# Patient Record
Sex: MALE | Race: OTHER
[De-identification: names, ages, dates, MRNs, and addresses within clinical notes are randomized per-mention and may not be internally consistent; named-entity substitution may affect disease eponyms.]

---

## 2019-08-08 ENCOUNTER — HOSPITAL ENCOUNTER (EMERGENCY)
Dept: HOSPITAL 25 - UCCORT | Age: 29
Discharge: HOME | End: 2019-08-08
Payer: COMMERCIAL

## 2019-08-08 VITALS — DIASTOLIC BLOOD PRESSURE: 52 MMHG | SYSTOLIC BLOOD PRESSURE: 105 MMHG

## 2019-08-08 DIAGNOSIS — S69.92XA: ICD-10-CM

## 2019-08-08 DIAGNOSIS — S59.912A: ICD-10-CM
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
## 2019-08-08 DIAGNOSIS — S60.222A: Primary | ICD-10-CM

## 2019-08-08 PROCEDURE — G0463 HOSPITAL OUTPT CLINIC VISIT: HCPCS

## 2019-08-08 PROCEDURE — 36415 COLL VENOUS BLD VENIPUNCTURE: CPT

## 2019-08-08 PROCEDURE — 99212 OFFICE O/P EST SF 10 MIN: CPT

## 2019-08-08 PROCEDURE — 87389 HIV-1 AG W/HIV-1&-2 AB AG IA: CPT

## 2019-08-08 NOTE — UC
Upper Extremity HPI





- HPI Summary


HPI Summary: 





Pt presents with c/o left hand, forearm and elbow pain.  He reports that he 

fell from standing on 8/3/19 and has continued c/o left hand pain and swelling 

an left forearm pain.  Pt has been applying ice and taking ibuprofen with 

little to no improvement of pain.  





- History of Current Complaint


Chief Complaint: UCUpperExtremity


Stated Complaint: LEFT HAND/ELBOW PAIN


Time Seen by Provider: 08/08/19 10:46


Hx Obtained From: Patient


Pregnant?: No


Onset/Duration: Sudden Onset, Lasting Days, Still Present


Severity Initially: Moderate


Severity Currently: Moderate


Pain Intensity: 5


Pain Scale Used: 0-10 Numeric


Location Of Pain: Is Discrete @ - left hand, forearm and elbow


Character: Dull, Aching, Stiffness


Aggravating Factor(s): Movement, Lifting, Flexion, Extension, Internal/External 

Rotation, Abduction, Adduction


Alleviating Factor(s): Rest


Associated Signs And Symptoms: Positive: Swelling


Related History: Dominant Hand Right





- Risk Factors


Non-Orthopedic Risk Factor: Negative


DVT Risk Factors: Negative


Septic Arthritis Risk Factor: Negative


Compartment Syndrome Risk Factors: Pain





- Allergies/Home Medications


Allergies/Adverse Reactions: 


 Allergies











Allergy/AdvReac Type Severity Reaction Status Date / Time


 


cephalexin Allergy  Oral Verified 08/08/19 10:13





   Blisters  


 


antibiotic/unknown AdvReac Intermediate Diarrhea Uncoded 08/08/19 10:13














PMH/Surg Hx/FS Hx/Imm Hx


Previously Healthy: Yes





- Surgical History


Surgical History: None





- Family History


Known Family History: Positive: Hypertension, Diabetes


   Negative: Respiratory Disease





- Social History


Occupation: Employed Full-time


Lives: With Family


Alcohol Use: None


Substance Use Type: None


Smoking Status (MU): Never Smoked Tobacco


Have You Smoked in the Last Year: No





- Immunization History


Vaccination Up to Date: Yes





Review of Systems


All Other Systems Reviewed And Are Negative: Yes


Constitutional: Positive: Negative


Skin: Positive: Negative


Eyes: Positive: Negative


ENT: Positive: Negative


Respiratory: Positive: Negative


Cardiovascular: Positive: Negative


Gastrointestinal: Positive: Negative


Genitourinary: Positive: Negative


Motor: Positive: Decreased ROM - left hand and forearm, Weakness


Neurovascular: Positive: Negative


Musculoskeletal: Positive: Arthralgia - left hand and forearm, Decreased ROM - 

left hand and forearm, Edema - left hand, Myalgia


Neurological: Positive: Negative


Psychological: Positive: Negative


Is Patient Immunocompromised?: No





Physical Exam


Triage Information Reviewed: Yes


Appearance: Pain Distress


Vital Signs: 


 Initial Vital Signs











Temp  97.4 F   08/08/19 10:14


 


Pulse  55   08/08/19 10:14


 


Resp  15   08/08/19 10:14


 


BP  105/52   08/08/19 10:14


 


Pulse Ox  100   08/08/19 10:14











Vital Signs Reviewed: Yes


Eye Exam: Normal


ENT: Positive: Hearing grossly normal


Dental Exam: Normal


Neck exam: Normal


Respiratory Exam: Normal


Musculoskeletal: Positive: Strength Limited @ - left forearma nd hand, ROM 

Limited @ - left elbow and forearm, Edema @ - left hand, non pitting


Neurological Exam: Normal


Psychological Exam: Normal


Skin Exam: Normal





Diagnostics





- Radiology


  ** No standard instances


Radiology Interpretation Completed By: Radiologist - 4 views of the left hand 

demonstrates positive ulnar variance. Otherwise there is no fracture or 

dislocation. No other bone or joint abnormality is identified.  IMPRESSION: No 

fracture of the left hand is noted.  4 views of left elbow demonstrates no 

fracture. No other bone or joint abnormality is identified. No joint effusion 

is noted  IMPRESSION: No fracture of the left elbow is noted.





Upper Extremity Course/Dx





- Differential Dx/Diagnosis


Differential Diagnosis/HQI/PQRI: Contusion, Strain, Sprain


Provider Diagnosis: 


 Contusion of left hand, Injury of left forearm and wrist








Discharge





- Sign-Out/Discharge


Documenting (check all that apply): Patient Departure


All imaging exams completed and their final reports reviewed: Yes





- Discharge Plan


Condition: Stable


Disposition: HOME


Patient Education Materials:  Wrist Injury (ED), R.I.C.E. Treatment (ED), Arm 

Pain (ED)


Print Language: ENGLISH


Forms:  *Work Release


Referrals: 


[facility] [place] PHYSICIAN REFERRAL [Outside] - If Needed


Azael Nunez MD [Medical Doctor] - If Needed


No Primary Care Phys,NOPCP [Primary Care Provider] - 





- Billing Disposition and Condition


Condition: STABLE


Disposition: Home